# Patient Record
(demographics unavailable — no encounter records)

---

## 2025-03-21 NOTE — PHYSICAL EXAM
[Normal] : affect appropriate Primary Defect Width In Cm (Final Defect Size - Required For Flaps/Grafts): 1.8

## 2025-03-21 NOTE — HISTORY OF PRESENT ILLNESS
[de-identified] :   This is a 51 year year old morbidly obese patient with a BMI of 44. She has tried numerous diet and exercise attempts as contained hereafter. Patient has been obese for several years, has joint pain.  Calcipotriene Pregnancy And Lactation Text: This medication has not been proven safe during pregnancy. It is unknown if this medication is excreted in breast milk.

## 2025-04-11 NOTE — HISTORY OF PRESENT ILLNESS
[Never] : never [TextBox_4] : No lung issues in the past HTN HLD Post nasal drip  No snoring Refreshed from sleep No tiredness Does not need to nap   Never has BLANCA No asthma  Family hx: no lung ca

## 2025-04-14 NOTE — ASSESSMENT
[FreeTextEntry1] : Mr. Nassar is a 51F with history of HTN presenting to [facility] care and pending gastric bypass and sleeve surgery.   1) Preoperative evaluation EKG NSR without ischemic changes No ischemic symptoms. Euvolemic on exam.  METS>4, good functional capacity.  Will order for echocardiogram for risk stratification. Patient is low-intermediate risk for intermediate risk procedure.   2) HTN Losartan continue LDL mild elevation  3) Hyperlipidemia Mild LDL elevation. Borderline, encourage lifestyle changes and repeat on follow up  4) Pre-DM A1c 6.0, lifestyle changes and has bariatic

## 2025-04-14 NOTE — HISTORY OF PRESENT ILLNESS
[FreeTextEntry1] : Mr. Nassar is a 51F with history of HTN presenting to [facility] care and pending gastric bypass and sleeve surgery.   Denies previous cardiac work up. Denies chest pain, no shortness of breath. reports good functional capacity walks up 1 flight of stairs without symptoms, METS>4.  Denies orthopnea, no LE edema, no syncope or LOC.    Medications: losartan 50mg FHx: Denies  Social: Never smoker, Denies ETOH, denies recreational meds Female: 5 pregnancies, 3 csections, denies complications, no pre-eclampsia/htn or gestational Dm Surgeries: cholesectomy denies issues with anesthesia  TSH: 1.13

## 2025-04-14 NOTE — REASON FOR VISIT
[FreeTextEntry1] : Mr. Nassar is a 51F with history of HTN presenting to [facility] care and pending gastric bypass and sleeve surgery.

## 2025-04-14 NOTE — ASSESSMENT
[FreeTextEntry1] : Mr. Nassar is a 51F with history of HTN presenting to [facility] care and pending gastric bypass and sleeve surgery.   1) Preoperative evaluation EKG NSR without ischemic changes No ischemic symptoms. Euvolemic on exam.  METS>4, good functional capacity.  Will order for echocardiogram for risk stratification. Patient is low-intermediate risk for intermediate risk procedure.   2) HTN Losartan continue LDL mild elevation  3) Hyperlipidemia Mild LDL elevation. Borderline, encourage lifestyle changes and repeat on follow up  4) Pre-DM A1c 6.0, lifestyle changes and has bariatic

## 2025-05-09 NOTE — HISTORY OF PRESENT ILLNESS
[de-identified] :  This is a morbidly obese patient with a BMI of Body mass index of 43. She has tried numerous diet and exercise attempts as contained hereafter. She has worked hard with nutrition and psychology to lose weight and is ready for a weight loss procedure.

## 2025-05-09 NOTE — PLAN
[FreeTextEntry1] : Schedule patient for laparoscopic sleeve gastrectomy, possible open with endoscopy on 7/17/25.  R/B/A explained  Patient understands.  Instructed patient on 2 week liquid protein diet.   They do not have any plans to get pregnant in the foreseeable future, in the next 12 to 18 months.  They have been attending online support groups for the last few months and has been an active participant in these sessions. Due to her participation in these sessions, I believe that they would be an excellent and motivated candidate for weight loss surgery.  I feel that this patient would benefit from a procedure for morbid obesity; This patient meets the NIH requirement for bariatric surgery. They have attended a seminar and had the risks and benefits of all the bariatric procedures explained to them; and has had an opportunity to ask questions both in private and public. They are ready to proceed with their decision to have a sleeve gastrectomy with upper endoscopy. In preparation for surgery, I discussed the following in detail with the patient: Risks of surgery, including:   Death (~1%)   Leak and peritonitis (~2%)   Injury to internal organs including: esophagus,stomach, small intestine, colon, spleen (& possible splenectomy), liver, pancreas. General risks, including:  MI   CVA   DVT/PE   Bleeding   Infection   Obstruction/Adhesions/Internal Hernia   Incisional Infection/Hernia/Dehiscence I have reviewed the completed preoperative consults including:  Cardiology   Pulmonary   Nutrition   Psychological   EGD   Ultrasound of Gallbladder Pt. understands that NOT ALL excess weight will be lost; only 50-75% is expected for Gastric Bypass & Sleeve Gastrectomy; 40% for Adjustable gastric banding Pt. demonstrates understanding of tool concept Discussed diet & exercise as essential to postop regimen Pt. appears to understand need for lifelong follow-up Pt. appears to understand need for lifelong nutritional supplementation with vitamins/minerals/protein Pt. appears to understand that failure to comply with this regimen may result in long-term severe nutritional complications up to and including death. Pt. wishes to proceed with the plan as outlined above. Patient will proceed with preoperative testing in preparation for scheduled procedure.